# Patient Record
Sex: FEMALE | Race: BLACK OR AFRICAN AMERICAN | NOT HISPANIC OR LATINO | URBAN - METROPOLITAN AREA
[De-identification: names, ages, dates, MRNs, and addresses within clinical notes are randomized per-mention and may not be internally consistent; named-entity substitution may affect disease eponyms.]

---

## 2022-10-26 ENCOUNTER — OFFICE VISIT (OUTPATIENT)
Dept: URBAN - METROPOLITAN AREA CLINIC 92 | Facility: CLINIC | Age: 52
End: 2022-10-26

## 2022-11-30 ENCOUNTER — OFFICE VISIT (OUTPATIENT)
Dept: URBAN - METROPOLITAN AREA CLINIC 92 | Facility: CLINIC | Age: 52
End: 2022-11-30

## 2023-01-17 ENCOUNTER — OFFICE VISIT (OUTPATIENT)
Dept: URBAN - METROPOLITAN AREA CLINIC 92 | Facility: CLINIC | Age: 53
End: 2023-01-17
Payer: COMMERCIAL

## 2023-01-17 VITALS
SYSTOLIC BLOOD PRESSURE: 131 MMHG | WEIGHT: 214 LBS | TEMPERATURE: 97.2 F | BODY MASS INDEX: 40.4 KG/M2 | HEART RATE: 96 BPM | HEIGHT: 61 IN | DIASTOLIC BLOOD PRESSURE: 78 MMHG

## 2023-01-17 DIAGNOSIS — R11.0 NAUSEA: ICD-10-CM

## 2023-01-17 DIAGNOSIS — K21.9 GERD (GASTROESOPHAGEAL REFLUX DISEASE): ICD-10-CM

## 2023-01-17 DIAGNOSIS — R10.84 ABDOMINAL PAIN, GENERALIZED: ICD-10-CM

## 2023-01-17 DIAGNOSIS — R19.7 DIARRHEA: ICD-10-CM

## 2023-01-17 PROBLEM — 428283002 HISTORY OF POLYP OF COLON: Status: ACTIVE | Noted: 2023-01-14

## 2023-01-17 PROBLEM — 14760008 CONSTIPATION: Status: ACTIVE | Noted: 2023-01-17

## 2023-01-17 PROBLEM — 10743008 IRRITABLE BOWEL SYNDROME: Status: ACTIVE | Noted: 2023-01-14

## 2023-01-17 PROCEDURE — 99204 OFFICE O/P NEW MOD 45 MIN: CPT | Performed by: INTERNAL MEDICINE

## 2023-01-17 PROCEDURE — 99244 OFF/OP CNSLTJ NEW/EST MOD 40: CPT | Performed by: INTERNAL MEDICINE

## 2023-01-17 RX ORDER — HYOSCYAMINE SULFATE 0.12 MG/1
1 TABLET UNDER THE TONGUE AND ALLOW TO DISSOLVE  AS NEEDED TABLET SUBLINGUAL
Refills: 3 | OUTPATIENT
Start: 2023-01-17 | End: 2023-05-17

## 2023-01-17 RX ORDER — CALCIUM POLYCARBOPHIL 625 MG
2 TABLETS AS NEEDED TABLET ORAL
Qty: 120 TABLET | Refills: 11 | OUTPATIENT
Start: 2023-01-17 | End: 2024-01-12

## 2023-01-17 RX ORDER — SACCHAROMYCES BOULARDII 50 MG
2 CAPSULE CAPSULE ORAL TWICE A DAY
Qty: 120 CAPSULE | Refills: 11 | OUTPATIENT
Start: 2023-01-17 | End: 2024-01-12

## 2023-01-17 RX ORDER — ONDANSETRON HYDROCHLORIDE 8 MG/1
1 CAPSULE TABLET, FILM COATED ORAL
Qty: 90 | Refills: 3 | OUTPATIENT
Start: 2023-01-17 | End: 2023-05-17

## 2023-01-17 NOTE — HPI-TODAY'S VISIT:
Patient was referred by Dr. Anthony Simon for an evaluation of abd pain.  A copy of this note will be sent to the referring provider  wt incr 14# over 7y   Abd pain Location epig Quality achy/crampy Frequency intermittent Duration few months Radiation diffuse Severity mdoerate Exacerbating factors none obvious Relieving factors none obvious   Assoc with diarrhea alt w constip, nausea w/o emesis, fecal urgency   Denies hematochezia melena jaundice unintentional wt loss   Denies dyspepsia htbrn dysphagia odynophagia food impaction CP cough anorexia light headedness   Denies scleral icterus, increased abd girth, LE edema, confusion, disorientation, memory loss, hematemesis  NSAID usage: no  EtOH : 3d /wk / Tob: no  Fam Hx GI cancer: no  Prior EGD/colon, 2y ago at [facility] negative per her report Labs 2mo ago w PCP normal per her report

## 2023-01-20 ENCOUNTER — WEB ENCOUNTER (OUTPATIENT)
Dept: URBAN - METROPOLITAN AREA CLINIC 92 | Facility: CLINIC | Age: 53
End: 2023-01-20

## 2023-01-20 RX ORDER — HYOSCYAMINE SULFATE 0.12 MG/1
1 TABLET UNDER THE TONGUE AND ALLOW TO DISSOLVE  AS NEEDED TABLET SUBLINGUAL
Qty: 270 | Refills: 3

## 2023-01-20 RX ORDER — SACCHAROMYCES BOULARDII 50 MG
2 CAPSULE CAPSULE ORAL TWICE A DAY
Qty: 360 | Refills: 3

## 2023-01-23 PROBLEM — 235595009 GASTROESOPHAGEAL REFLUX DISEASE: Status: ACTIVE | Noted: 2023-01-14

## 2023-02-06 ENCOUNTER — WEB ENCOUNTER (OUTPATIENT)
Dept: URBAN - METROPOLITAN AREA CLINIC 92 | Facility: CLINIC | Age: 53
End: 2023-02-06

## 2023-02-15 ENCOUNTER — WEB ENCOUNTER (OUTPATIENT)
Dept: URBAN - METROPOLITAN AREA CLINIC 92 | Facility: CLINIC | Age: 53
End: 2023-02-15

## 2023-02-15 RX ORDER — SACCHAROMYCES BOULARDII 50 MG
2 CAPSULE CAPSULE ORAL TWICE A DAY
Qty: 360 | Refills: 3 | Status: ACTIVE | COMMUNITY

## 2023-02-15 RX ORDER — CALCIUM POLYCARBOPHIL 625 MG
2 TABLETS AS NEEDED TABLET ORAL
Qty: 120 TABLET | Refills: 11 | Status: ACTIVE | COMMUNITY
Start: 2023-01-17 | End: 2024-01-12

## 2023-02-15 RX ORDER — DICYCLOMINE HYDROCHLORIDE 20 MG/1
1 TABLET TABLET ORAL THREE TIMES A DAY
Qty: 90 | Refills: 3 | OUTPATIENT

## 2023-02-15 RX ORDER — HYOSCYAMINE SULFATE 0.12 MG/1
1 TABLET UNDER THE TONGUE AND ALLOW TO DISSOLVE  AS NEEDED TABLET SUBLINGUAL
Qty: 270 | Refills: 3 | Status: ACTIVE | COMMUNITY

## 2023-02-15 RX ORDER — ONDANSETRON HYDROCHLORIDE 8 MG/1
1 CAPSULE TABLET, FILM COATED ORAL
Qty: 90 | Refills: 3 | Status: ACTIVE | COMMUNITY
Start: 2023-01-17 | End: 2023-05-17

## 2023-03-22 ENCOUNTER — LAB OUTSIDE AN ENCOUNTER (OUTPATIENT)
Dept: URBAN - METROPOLITAN AREA TELEHEALTH 2 | Facility: TELEHEALTH | Age: 53
End: 2023-03-22

## 2023-03-22 ENCOUNTER — TELEPHONE ENCOUNTER (OUTPATIENT)
Dept: URBAN - METROPOLITAN AREA CLINIC 35 | Facility: CLINIC | Age: 53
End: 2023-03-22

## 2023-03-22 ENCOUNTER — OFFICE VISIT (OUTPATIENT)
Dept: URBAN - METROPOLITAN AREA TELEHEALTH 2 | Facility: TELEHEALTH | Age: 53
End: 2023-03-22
Payer: COMMERCIAL

## 2023-03-22 VITALS — BODY MASS INDEX: 39.65 KG/M2 | WEIGHT: 210 LBS | HEIGHT: 61 IN

## 2023-03-22 DIAGNOSIS — K76.0 FATTY LIVER: ICD-10-CM

## 2023-03-22 DIAGNOSIS — K21.9 GERD (GASTROESOPHAGEAL REFLUX DISEASE): ICD-10-CM

## 2023-03-22 DIAGNOSIS — R10.84 ABDOMINAL CRAMPING, GENERALIZED: ICD-10-CM

## 2023-03-22 DIAGNOSIS — K58.1 IBS: ICD-10-CM

## 2023-03-22 PROBLEM — 197321007 FATTY LIVER: Status: ACTIVE | Noted: 2023-03-22

## 2023-03-22 PROCEDURE — 99214 OFFICE O/P EST MOD 30 MIN: CPT | Performed by: INTERNAL MEDICINE

## 2023-03-22 RX ORDER — SACCHAROMYCES BOULARDII 50 MG
2 CAPSULE CAPSULE ORAL TWICE A DAY
Qty: 120 CAPSULE | Refills: 11 | OUTPATIENT

## 2023-03-22 RX ORDER — HYOSCYAMINE SULFATE 0.12 MG/1
1 TABLET UNDER THE TONGUE AND ALLOW TO DISSOLVE  AS NEEDED TABLET SUBLINGUAL
Refills: 3 | OUTPATIENT

## 2023-03-22 RX ORDER — ONDANSETRON HYDROCHLORIDE 8 MG/1
1 CAPSULE TABLET, FILM COATED ORAL
Qty: 90 | Refills: 3 | COMMUNITY
Start: 2023-01-17 | End: 2023-05-17

## 2023-03-22 RX ORDER — DICYCLOMINE HYDROCHLORIDE 20 MG/1
1 TABLET TABLET ORAL THREE TIMES A DAY
Qty: 90 | Refills: 3 | COMMUNITY

## 2023-03-22 RX ORDER — SACCHAROMYCES BOULARDII 50 MG
2 CAPSULE CAPSULE ORAL TWICE A DAY
Qty: 360 | Refills: 3 | COMMUNITY

## 2023-03-22 RX ORDER — ONDANSETRON HYDROCHLORIDE 8 MG/1
1 CAPSULE TABLET, FILM COATED ORAL
Qty: 90 | Refills: 3 | OUTPATIENT

## 2023-03-22 RX ORDER — HYOSCYAMINE SULFATE 0.12 MG/1
1 TABLET UNDER THE TONGUE AND ALLOW TO DISSOLVE  AS NEEDED TABLET SUBLINGUAL
Qty: 270 | Refills: 3 | COMMUNITY

## 2023-03-22 RX ORDER — CALCIUM POLYCARBOPHIL 625 MG
2 TABLETS AS NEEDED TABLET ORAL
Qty: 120 TABLET | Refills: 11 | COMMUNITY
Start: 2023-01-17 | End: 2024-01-12

## 2023-03-22 RX ORDER — CALCIUM POLYCARBOPHIL 625 MG
2 TABLETS AS NEEDED TABLET ORAL
Qty: 120 TABLET | Refills: 11 | OUTPATIENT

## 2023-03-22 NOTE — HPI-TODAY'S VISIT:
wt decr 4# over 2mo (was 214)  on Levsin, Florastor, fibercon, and zofran   Abd pain Location epig Quality achy/crampy Frequency intermittent Duration few months Radiation diffuse Severity mdoerate Exacerbating factors none obvious Relieving factors none obvious   Assoc with occas straining to have a BM  nausea has resolved; notes 3 BM/day   Denies hematochezia melena jaundice unintentional wt loss   Denies dyspepsia htbrn dysphagia odynophagia food impaction CP cough anorexia light headedness   Denies scleral icterus, increased abd girth, LE edema, confusion, disorientation, memory loss, hematemesis  NSAID usage: no  EtOH : 3d /wk / Tob: no  Fam Hx GI cancer: no  3/17/23 CT ABDOMEN PELVIS W IV CONTRAST   CLINICAL INDICATION: Unspecified abdominal pain.   TECHNIQUE: Following administration of non-ionic IV contrast, postcontrast images through the abdomen and pelvis were obtained. [id number] If applicable, point-of-care testing?was approved following departmental protocol.   COMPARISON: CT 9/27/2022    FINDINGS:  Lower Thorax: Curvilinear scarring versus subsegmental atelectasis in the lung bases.   Liver: Mild morphologic changes of chronic liver disease evidence by lobulated contour and fissural widening.   Gallbladder/Biliary Tree: Normal.   Spleen: Normal.   Pancreas: Normal.   Adrenal Glands: Normal.   Kidneys/Ureters: No hydronephrosis or urolithiasis.   Gastrointestinal: No bowel wall thickening or obstruction. Normal appendix. Oral contrast opacifies the stomach and small bowel without extravasation. Hiatal hernia.   Bladder: Poorly distended and suboptimally evaluated.   Uterus/Adnexa: Unremarkable appearance of the uterus and adnexa. Pessary ring in place.    Lymph Nodes: No pathologically enlarged lymph nodes.   Vessels: Mild atherosclerotic disease.   Peritoneum/Retroperitoneum: No free air or ascites.   Bones/Soft Tissues: No suspicious osseous lesion. Asymmetric right sacroiliitis, unchanged since 9/27/2022 and minimally changed since 6/22/2016.     IMPRESSION:   1.  Mild morphologic changes of chronic liver disease. 2.  Other chronic/incidental findings as above.  Prior EGD/colon, 2.5y ago at [facility] negative per her report Labs 4mo ago w PCP normal per her report

## 2023-03-31 ENCOUNTER — WEB ENCOUNTER (OUTPATIENT)
Dept: URBAN - METROPOLITAN AREA CLINIC 91 | Facility: CLINIC | Age: 53
End: 2023-03-31

## 2023-04-06 ENCOUNTER — OFFICE VISIT (OUTPATIENT)
Dept: URBAN - METROPOLITAN AREA CLINIC 91 | Facility: CLINIC | Age: 53
End: 2023-04-06
Payer: COMMERCIAL

## 2023-04-06 DIAGNOSIS — K76.0 FATTY LIVER: ICD-10-CM

## 2023-04-06 DIAGNOSIS — R10.84 ABDOMINAL CRAMPING, GENERALIZED: ICD-10-CM

## 2023-04-06 PROCEDURE — 76700 US EXAM ABDOM COMPLETE: CPT | Performed by: INTERNAL MEDICINE

## 2023-04-07 ENCOUNTER — TELEPHONE ENCOUNTER (OUTPATIENT)
Dept: URBAN - METROPOLITAN AREA CLINIC 35 | Facility: CLINIC | Age: 53
End: 2023-04-07

## 2023-05-02 ENCOUNTER — DASHBOARD ENCOUNTERS (OUTPATIENT)
Age: 53
End: 2023-05-02

## 2023-05-03 ENCOUNTER — WEB ENCOUNTER (OUTPATIENT)
Dept: URBAN - METROPOLITAN AREA TELEHEALTH 2 | Facility: TELEHEALTH | Age: 53
End: 2023-05-03

## 2023-05-04 ENCOUNTER — OFFICE VISIT (OUTPATIENT)
Dept: URBAN - METROPOLITAN AREA TELEHEALTH 2 | Facility: TELEHEALTH | Age: 53
End: 2023-05-04
Payer: COMMERCIAL

## 2023-05-04 VITALS — WEIGHT: 210 LBS | BODY MASS INDEX: 39.65 KG/M2 | HEIGHT: 61 IN

## 2023-05-04 DIAGNOSIS — K76.0 FATTY LIVER: ICD-10-CM

## 2023-05-04 DIAGNOSIS — K21.9 GERD (GASTROESOPHAGEAL REFLUX DISEASE): ICD-10-CM

## 2023-05-04 DIAGNOSIS — K58.8 OTHER IRRITABLE BOWEL SYNDROME: ICD-10-CM

## 2023-05-04 DIAGNOSIS — Z86.010 PERSONAL HISTORY OF COLON POLYPS: ICD-10-CM

## 2023-05-04 PROCEDURE — 99214 OFFICE O/P EST MOD 30 MIN: CPT | Performed by: INTERNAL MEDICINE

## 2023-05-04 RX ORDER — HYOSCYAMINE SULFATE 0.12 MG/1
1 TABLET UNDER THE TONGUE AND ALLOW TO DISSOLVE  AS NEEDED TABLET SUBLINGUAL
Refills: 3 | OUTPATIENT

## 2023-05-04 RX ORDER — ONDANSETRON HYDROCHLORIDE 8 MG/1
1 CAPSULE TABLET, FILM COATED ORAL
Qty: 90 | Refills: 3 | Status: ON HOLD | COMMUNITY

## 2023-05-04 RX ORDER — SACCHAROMYCES BOULARDII 50 MG
2 CAPSULE CAPSULE ORAL TWICE A DAY
Qty: 120 CAPSULE | Refills: 11 | COMMUNITY

## 2023-05-04 RX ORDER — HYOSCYAMINE SULFATE 0.12 MG/1
1 TABLET UNDER THE TONGUE AND ALLOW TO DISSOLVE  AS NEEDED TABLET SUBLINGUAL
Refills: 3 | COMMUNITY

## 2023-05-04 NOTE — HPI-TODAY'S VISIT:
wt stable# over 6wk (was 210)  4/6/23 RUQ US mild fatty liver o/w WNL  on Levsin, Florastor, fibercon, and zofran   Abd pain persistent; now more RUQ Assoc with occas straining to have a BM nausea has resolved notes 3-4 solid BM/day   Denies hematochezia melena jaundice unintentional wt loss   Denies htbrn dysphagia odynophagia food impaction CP cough anorexia light headedness   Denies scleral icterus, increased abd girth, LE edema, confusion, disorientation, memory loss, hematemesis  NSAID usage: no EtOH : 3d /wk / Tob: no Fam Hx GI cancer: no  3/17/23 CT ABDOMEN PELVIS W IV CONTRAST 1.  Mild morphologic changes of chronic liver disease. 2.  Other chronic/incidental findings as above.  Prior EGD/colon, 2.5y ago at [facility] negative per her report Labs 4mo ago w PCP normal per her report

## 2024-06-27 ENCOUNTER — OFFICE VISIT (OUTPATIENT)
Dept: URBAN - METROPOLITAN AREA CLINIC 92 | Facility: CLINIC | Age: 54
End: 2024-06-27
Payer: COMMERCIAL

## 2024-06-27 ENCOUNTER — LAB OUTSIDE AN ENCOUNTER (OUTPATIENT)
Dept: URBAN - METROPOLITAN AREA CLINIC 92 | Facility: CLINIC | Age: 54
End: 2024-06-27

## 2024-06-27 VITALS
HEART RATE: 96 BPM | TEMPERATURE: 96.7 F | WEIGHT: 205 LBS | HEIGHT: 61 IN | BODY MASS INDEX: 38.71 KG/M2 | SYSTOLIC BLOOD PRESSURE: 113 MMHG | DIASTOLIC BLOOD PRESSURE: 79 MMHG

## 2024-06-27 DIAGNOSIS — K76.0 FATTY LIVER: ICD-10-CM

## 2024-06-27 DIAGNOSIS — Z86.010 PERSONAL HISTORY OF COLON POLYPS: ICD-10-CM

## 2024-06-27 DIAGNOSIS — K21.9 GERD (GASTROESOPHAGEAL REFLUX DISEASE): ICD-10-CM

## 2024-06-27 DIAGNOSIS — K58.9 IBS (IRRITABLE BOWEL SYNDROME): ICD-10-CM

## 2024-06-27 DIAGNOSIS — R10.9 ABDOMINAL PAIN: ICD-10-CM

## 2024-06-27 DIAGNOSIS — R13.19 ESOPHAGEAL DYSPHAGIA: ICD-10-CM

## 2024-06-27 DIAGNOSIS — E66.01 MORBID (SEVERE) OBESITY DUE TO EXCESS CALORIES: ICD-10-CM

## 2024-06-27 PROBLEM — 162864005: Status: ACTIVE | Noted: 2024-06-27

## 2024-06-27 PROBLEM — 83911000119104: Status: ACTIVE | Noted: 2024-06-27

## 2024-06-27 PROCEDURE — 99214 OFFICE O/P EST MOD 30 MIN: CPT | Performed by: INTERNAL MEDICINE

## 2024-06-27 RX ORDER — AMLODIPINE BESYLATE 2.5 MG/1
1 TABLET TABLET ORAL ONCE A DAY
Status: ACTIVE | COMMUNITY

## 2024-06-27 RX ORDER — OMEPRAZOLE 40 MG/1
1 CAPSULE 30 MINUTES BEFORE MORNING MEAL CAPSULE, DELAYED RELEASE ORAL ONCE A DAY
Status: ACTIVE | COMMUNITY

## 2024-06-27 RX ORDER — SACCHAROMYCES BOULARDII 50 MG
2 CAPSULE CAPSULE ORAL TWICE A DAY
Qty: 120 CAPSULE | Refills: 11 | Status: ACTIVE | COMMUNITY

## 2024-06-27 RX ORDER — ONDANSETRON HYDROCHLORIDE 8 MG/1
1 CAPSULE TABLET, FILM COATED ORAL
Qty: 90 | Refills: 3 | Status: ACTIVE | COMMUNITY

## 2024-06-27 RX ORDER — FAMOTIDINE 20 MG/1
1 TABLET AT BEDTIME AS NEEDED TABLET, FILM COATED ORAL ONCE A DAY
Status: ACTIVE | COMMUNITY

## 2024-06-27 RX ORDER — HYOSCYAMINE SULFATE 0.12 MG/1
1 TABLET UNDER THE TONGUE AND ALLOW TO DISSOLVE  AS NEEDED TABLET SUBLINGUAL
Refills: 3 | Status: ON HOLD | COMMUNITY

## 2024-06-27 NOTE — HPI-TODAY'S VISIT:
This is a 54 yo female here for follow up.  She is a former patient of Ignacio Godfrey last seen one year ago.  Today the patient complains of epigastric pain which developed 4 months ago.  The pain is exacerbated with meals, improved with a bowel movement, rated a 7 and occurs 3 times a week and lasts 20 - 30 minutes.  Stress does not exacerbate symptoms.  She was placed on Omeprazole/Famotidine years ago for GERD which she still takes as needed.  She denies NSAID use, black stools, vomiting.  This pain has been intermittent for years.  She admits to dysphagia to solids 3 - 4 times a week.  Her last EGD was 2015.

## 2024-06-27 NOTE — PHYSICAL EXAM RECTAL:
normal tone, no external hemorrhoids, no masses palpable, no red blood, Tenderness on ZITA, Internal hemorrhoids present

## 2024-07-15 ENCOUNTER — CLAIMS CREATED FROM THE CLAIM WINDOW (OUTPATIENT)
Dept: URBAN - METROPOLITAN AREA SURGERY CENTER 16 | Facility: SURGERY CENTER | Age: 54
End: 2024-07-15
Payer: COMMERCIAL

## 2024-07-15 ENCOUNTER — CLAIMS CREATED FROM THE CLAIM WINDOW (OUTPATIENT)
Dept: URBAN - METROPOLITAN AREA CLINIC 4 | Facility: CLINIC | Age: 54
End: 2024-07-15
Payer: COMMERCIAL

## 2024-07-15 DIAGNOSIS — K21.9 GASTRIC REFLUX: ICD-10-CM

## 2024-07-15 DIAGNOSIS — K22.2 ACQUIRED ESOPHAGEAL RING: ICD-10-CM

## 2024-07-15 DIAGNOSIS — K22.2 ESOPHAGEAL STENOSIS: ICD-10-CM

## 2024-07-15 DIAGNOSIS — R13.10 DYSPHAGIA: ICD-10-CM

## 2024-07-15 DIAGNOSIS — K25.9 GASTRIC ULCERS: ICD-10-CM

## 2024-07-15 DIAGNOSIS — K21.9 ACID REFLUX: ICD-10-CM

## 2024-07-15 DIAGNOSIS — K21.9 GASTRO-ESOPHAGEAL REFLUX DISEASE WITHOUT ESOPHAGITIS: ICD-10-CM

## 2024-07-15 PROCEDURE — 43450 DILATE ESOPHAGUS 1/MULT PASS: CPT | Performed by: INTERNAL MEDICINE

## 2024-07-15 PROCEDURE — 88305 TISSUE EXAM BY PATHOLOGIST: CPT | Performed by: PATHOLOGY

## 2024-07-15 PROCEDURE — 43239 EGD BIOPSY SINGLE/MULTIPLE: CPT | Performed by: INTERNAL MEDICINE

## 2024-07-15 PROCEDURE — 88312 SPECIAL STAINS GROUP 1: CPT | Performed by: PATHOLOGY

## 2024-07-15 PROCEDURE — 00731 ANES UPR GI NDSC PX NOS: CPT | Performed by: ANESTHESIOLOGY

## 2024-07-15 PROCEDURE — 00731 ANES UPR GI NDSC PX NOS: CPT | Performed by: ANESTHESIOLOGIST ASSISTANT

## 2024-08-22 ENCOUNTER — OFFICE VISIT (OUTPATIENT)
Dept: URBAN - METROPOLITAN AREA CLINIC 92 | Facility: CLINIC | Age: 54
End: 2024-08-22
Payer: COMMERCIAL

## 2024-08-22 VITALS
HEART RATE: 87 BPM | HEIGHT: 61 IN | DIASTOLIC BLOOD PRESSURE: 85 MMHG | WEIGHT: 204.6 LBS | TEMPERATURE: 97 F | SYSTOLIC BLOOD PRESSURE: 123 MMHG | BODY MASS INDEX: 38.63 KG/M2

## 2024-08-22 DIAGNOSIS — K21.9 GERD (GASTROESOPHAGEAL REFLUX DISEASE): ICD-10-CM

## 2024-08-22 DIAGNOSIS — K76.0 FATTY LIVER: ICD-10-CM

## 2024-08-22 DIAGNOSIS — R13.19 ESOPHAGEAL DYSPHAGIA: ICD-10-CM

## 2024-08-22 DIAGNOSIS — Z86.010 PERSONAL HISTORY OF COLON POLYPS: ICD-10-CM

## 2024-08-22 PROCEDURE — 99212 OFFICE O/P EST SF 10 MIN: CPT | Performed by: INTERNAL MEDICINE

## 2024-08-22 RX ORDER — OMEPRAZOLE 40 MG/1
1 CAPSULE 30 MINUTES BEFORE MORNING MEAL CAPSULE, DELAYED RELEASE ORAL ONCE A DAY
Status: ACTIVE | COMMUNITY

## 2024-08-22 RX ORDER — HYOSCYAMINE SULFATE 0.12 MG/1
1 TABLET UNDER THE TONGUE AND ALLOW TO DISSOLVE  AS NEEDED TABLET SUBLINGUAL
Refills: 3 | COMMUNITY

## 2024-08-22 RX ORDER — FAMOTIDINE 20 MG/1
1 TABLET AT BEDTIME AS NEEDED TABLET, FILM COATED ORAL ONCE A DAY
Status: ACTIVE | COMMUNITY

## 2024-08-22 RX ORDER — SACCHAROMYCES BOULARDII 50 MG
2 CAPSULE CAPSULE ORAL TWICE A DAY
Qty: 120 CAPSULE | Refills: 11 | Status: ACTIVE | COMMUNITY

## 2024-08-22 RX ORDER — AMLODIPINE BESYLATE 2.5 MG/1
1 TABLET TABLET ORAL ONCE A DAY
Status: ACTIVE | COMMUNITY

## 2024-08-22 RX ORDER — ONDANSETRON HYDROCHLORIDE 8 MG/1
1 CAPSULE TABLET, FILM COATED ORAL
Qty: 90 | Refills: 3 | Status: ACTIVE | COMMUNITY

## 2024-08-22 NOTE — HPI-TODAY'S VISIT:
This  is a 52 yo F here for follow up.  An EGD with dilation was performed July 2024.   40, 48, 50 and 52 Fr Ley dilators were used to dilate a distal esophageal stricture.  Biopsies revealed esophagitis.  Biopsies were not obtained from the stomach.  She takes Omeprazole each morning and Famotidine at night.  Swallowing has improved. Sx are aggraveted with spicy foods.

## 2024-08-22 NOTE — PHYSICAL EXAM MUSCULOSKELETAL:
normal gait and station, no deformities Include Z78.9 (Other Specified Conditions Influencing Health Status) As An Associated Diagnosis?: No